# Patient Record
Sex: FEMALE | Race: WHITE | NOT HISPANIC OR LATINO | Employment: FULL TIME | URBAN - METROPOLITAN AREA
[De-identification: names, ages, dates, MRNs, and addresses within clinical notes are randomized per-mention and may not be internally consistent; named-entity substitution may affect disease eponyms.]

---

## 2017-01-05 ENCOUNTER — APPOINTMENT (OUTPATIENT)
Dept: PHYSICAL THERAPY | Facility: HOSPITAL | Age: 62
End: 2017-01-05
Attending: INTERNAL MEDICINE

## 2017-01-12 ENCOUNTER — APPOINTMENT (OUTPATIENT)
Dept: PHYSICAL THERAPY | Facility: HOSPITAL | Age: 62
End: 2017-01-12
Attending: INTERNAL MEDICINE

## 2017-01-19 ENCOUNTER — APPOINTMENT (OUTPATIENT)
Dept: PHYSICAL THERAPY | Facility: HOSPITAL | Age: 62
End: 2017-01-19
Attending: INTERNAL MEDICINE

## 2017-07-06 ENCOUNTER — APPOINTMENT (OUTPATIENT)
Dept: WOMENS IMAGING | Facility: HOSPITAL | Age: 62
End: 2017-07-06

## 2017-07-06 PROCEDURE — 77067 SCR MAMMO BI INCL CAD: CPT | Performed by: RADIOLOGY

## 2017-07-06 PROCEDURE — G0202 SCR MAMMO BI INCL CAD: HCPCS | Performed by: RADIOLOGY

## 2017-07-17 ENCOUNTER — APPOINTMENT (OUTPATIENT)
Dept: WOMENS IMAGING | Facility: HOSPITAL | Age: 62
End: 2017-07-17

## 2017-07-17 PROCEDURE — 76641 ULTRASOUND BREAST COMPLETE: CPT | Performed by: RADIOLOGY

## 2017-07-17 PROCEDURE — G0206 DX MAMMO INCL CAD UNI: HCPCS | Performed by: RADIOLOGY

## 2017-07-17 PROCEDURE — 77065 DX MAMMO INCL CAD UNI: CPT | Performed by: RADIOLOGY

## 2018-01-05 ENCOUNTER — OFFICE VISIT (OUTPATIENT)
Dept: INTERNAL MEDICINE | Facility: CLINIC | Age: 63
End: 2018-01-05

## 2018-01-05 VITALS
BODY MASS INDEX: 22.76 KG/M2 | HEIGHT: 65 IN | SYSTOLIC BLOOD PRESSURE: 145 MMHG | OXYGEN SATURATION: 94 % | HEART RATE: 65 BPM | DIASTOLIC BLOOD PRESSURE: 73 MMHG | WEIGHT: 136.6 LBS | TEMPERATURE: 98.5 F

## 2018-01-05 DIAGNOSIS — Z00.00 HEALTH CARE MAINTENANCE: Primary | ICD-10-CM

## 2018-01-05 DIAGNOSIS — M25.542 ARTHRALGIA OF HANDS, BILATERAL: ICD-10-CM

## 2018-01-05 DIAGNOSIS — M25.541 ARTHRALGIA OF HANDS, BILATERAL: ICD-10-CM

## 2018-01-05 PROCEDURE — 99396 PREV VISIT EST AGE 40-64: CPT | Performed by: INTERNAL MEDICINE

## 2018-01-06 LAB
ALBUMIN SERPL-MCNC: 4.7 G/DL (ref 3.5–5.2)
ALBUMIN/GLOB SERPL: 1.6 G/DL
ALP SERPL-CCNC: 59 U/L (ref 39–117)
ALT SERPL-CCNC: 18 U/L (ref 1–33)
APPEARANCE UR: (no result)
AST SERPL-CCNC: 17 U/L (ref 1–32)
BACTERIA #/AREA URNS HPF: ABNORMAL /HPF
BASOPHILS # BLD AUTO: 0.02 10*3/MM3 (ref 0–0.2)
BASOPHILS NFR BLD AUTO: 0.4 % (ref 0–1.5)
BILIRUB SERPL-MCNC: 0.4 MG/DL (ref 0.1–1.2)
BILIRUB UR QL STRIP: NEGATIVE
BUN SERPL-MCNC: 11 MG/DL (ref 8–23)
BUN/CREAT SERPL: 12.6 (ref 7–25)
CALCIUM SERPL-MCNC: 10.2 MG/DL (ref 8.6–10.5)
CASTS URNS MICRO: ABNORMAL
CHLORIDE SERPL-SCNC: 104 MMOL/L (ref 98–107)
CO2 SERPL-SCNC: 28.4 MMOL/L (ref 22–29)
COLOR UR: YELLOW
CREAT SERPL-MCNC: 0.87 MG/DL (ref 0.57–1)
CRP SERPL-MCNC: 0.08 MG/DL (ref 0–0.5)
EOSINOPHIL # BLD AUTO: 0.12 10*3/MM3 (ref 0–0.7)
EOSINOPHIL NFR BLD AUTO: 2.2 % (ref 0.3–6.2)
EPI CELLS #/AREA URNS HPF: ABNORMAL /HPF
ERYTHROCYTE [DISTWIDTH] IN BLOOD BY AUTOMATED COUNT: 12.9 % (ref 11.7–13)
GLOBULIN SER CALC-MCNC: 3 GM/DL
GLUCOSE SERPL-MCNC: 94 MG/DL (ref 65–99)
GLUCOSE UR QL: NEGATIVE
HCT VFR BLD AUTO: 43.3 % (ref 35.6–45.5)
HGB BLD-MCNC: 14.4 G/DL (ref 11.9–15.5)
HGB UR QL STRIP: (no result)
IMM GRANULOCYTES # BLD: 0 10*3/MM3 (ref 0–0.03)
IMM GRANULOCYTES NFR BLD: 0 % (ref 0–0.5)
KETONES UR QL STRIP: NEGATIVE
LEUKOCYTE ESTERASE UR QL STRIP: NEGATIVE
LYMPHOCYTES # BLD AUTO: 1.79 10*3/MM3 (ref 0.9–4.8)
LYMPHOCYTES NFR BLD AUTO: 32.8 % (ref 19.6–45.3)
MCH RBC QN AUTO: 31.2 PG (ref 26.9–32)
MCHC RBC AUTO-ENTMCNC: 33.3 G/DL (ref 32.4–36.3)
MCV RBC AUTO: 93.9 FL (ref 80.5–98.2)
MONOCYTES # BLD AUTO: 0.3 10*3/MM3 (ref 0.2–1.2)
MONOCYTES NFR BLD AUTO: 5.5 % (ref 5–12)
NEUTROPHILS # BLD AUTO: 3.22 10*3/MM3 (ref 1.9–8.1)
NEUTROPHILS NFR BLD AUTO: 59.1 % (ref 42.7–76)
NITRITE UR QL STRIP: NEGATIVE
PH UR STRIP: 6.5 [PH] (ref 5–8)
PLATELET # BLD AUTO: 184 10*3/MM3 (ref 140–500)
POTASSIUM SERPL-SCNC: 4.2 MMOL/L (ref 3.5–5.2)
PROT SERPL-MCNC: 7.7 G/DL (ref 6–8.5)
PROT UR QL STRIP: NEGATIVE
RBC # BLD AUTO: 4.61 10*6/MM3 (ref 3.9–5.2)
RBC #/AREA URNS HPF: ABNORMAL /HPF
RHEUMATOID FACT SERPL-ACNC: 13.1 IU/ML (ref 0–13.9)
SODIUM SERPL-SCNC: 145 MMOL/L (ref 136–145)
SP GR UR: 1.01 (ref 1–1.03)
T4 FREE SERPL-MCNC: 1.41 NG/DL (ref 0.93–1.7)
TSH SERPL DL<=0.005 MIU/L-ACNC: 1.56 MIU/ML (ref 0.27–4.2)
UROBILINOGEN UR STRIP-MCNC: (no result) MG/DL
WBC # BLD AUTO: 5.45 10*3/MM3 (ref 4.5–10.7)
WBC #/AREA URNS HPF: ABNORMAL /HPF

## 2018-01-22 NOTE — PROGRESS NOTES
Subjective   Carol Johnson is a 62 y.o. female.   She is here today for complete physical exam except for gynecological part along with arthralgia of both hands and everything is stable overall  History of Present Illness   She is here today for complete exam except for gynecological part along with arthralgia of both hands and everything is stable overall except for the arthralgia  The following portions of the patient's history were reviewed and updated as appropriate: allergies, current medications, past family history, past medical history, past social history, past surgical history and problem list.    Review of Systems   Musculoskeletal: Positive for arthralgias.   All other systems reviewed and are negative.      Objective   Physical Exam   Constitutional: She is oriented to person, place, and time. Vital signs are normal. She appears well-developed and well-nourished. She is active.   HENT:   Head: Normocephalic and atraumatic.   Right Ear: Hearing, tympanic membrane, external ear and ear canal normal.   Left Ear: Hearing, tympanic membrane, external ear and ear canal normal.   Nose: Nose normal.   Mouth/Throat: Uvula is midline, oropharynx is clear and moist and mucous membranes are normal.   Eyes: Conjunctivae, EOM and lids are normal. Pupils are equal, round, and reactive to light. Right eye exhibits no discharge. Left eye exhibits no discharge.   Neck: Trachea normal, normal range of motion, full passive range of motion without pain and phonation normal. Neck supple. Carotid bruit is not present. No edema present. No thyroid mass and no thyromegaly present.   Cardiovascular: Normal rate, regular rhythm, normal heart sounds, intact distal pulses and normal pulses.  Exam reveals no gallop and no friction rub.    No murmur heard.  Pulmonary/Chest: Effort normal and breath sounds normal. No respiratory distress. She has no wheezes. She has no rales. Right breast exhibits no inverted nipple, no mass, no  nipple discharge, no skin change and no tenderness. Left breast exhibits no inverted nipple, no mass, no nipple discharge, no skin change and no tenderness. Breasts are symmetrical. There is no breast swelling.   Abdominal: Soft. Normal appearance, normal aorta and bowel sounds are normal. She exhibits no distension, no abdominal bruit and no mass. There is no hepatosplenomegaly. There is no tenderness. There is no rebound, no guarding and no CVA tenderness. No hernia. Hernia confirmed negative in the right inguinal area and confirmed negative in the left inguinal area.   Genitourinary: No breast tenderness, discharge or bleeding.   Musculoskeletal: Normal range of motion. She exhibits tenderness (both hands). She exhibits no edema.       Vascular Status -  Her exam exhibits right foot vasculature normal. Her exam exhibits no right foot edema. Her exam exhibits left foot vasculature normal. Her exam exhibits no left foot edema.   Skin Integrity  -  Her right foot skin is intact.     Carol 's left foot skin is intact. .  Lymphadenopathy:     She has no cervical adenopathy.     She has no axillary adenopathy.        Right: No inguinal and no supraclavicular adenopathy present.        Left: No inguinal and no supraclavicular adenopathy present.   Neurological: She is alert and oriented to person, place, and time. She has normal strength. No cranial nerve deficit or sensory deficit. She exhibits normal muscle tone. She displays a negative Romberg sign. Coordination normal.   Skin: Skin is warm, dry and intact. No cyanosis. Nails show no clubbing.   Psychiatric: She has a normal mood and affect. Her speech is normal and behavior is normal. Judgment and thought content normal. Cognition and memory are normal.   Nursing note and vitals reviewed.      Assessment/Plan   Diagnoses and all orders for this visit:    Health care maintenance  -     CBC & Differential  -     Comprehensive Metabolic Panel  -     T4, Free  -      TSH  -     Urinalysis With Microscopic - Urine, Clean Catch  -     Rheumatoid Factor, Quant  -     C-reactive Protein    Arthralgia of hands, bilateral  -     CBC & Differential  -     Comprehensive Metabolic Panel  -     T4, Free  -     TSH  -     Urinalysis With Microscopic - Urine, Clean Catch  -     Rheumatoid Factor, Quant  -     C-reactive Protein    Other orders  -     Microscopic Examination      Healthcare maintenance fasting labs vaccines colonoscopies pelvic exams mammograms DEXA scans on a regular basis  Arthralgia of bilateral hands we will check for rheumatoid arthritis and go from there

## 2018-07-10 ENCOUNTER — APPOINTMENT (OUTPATIENT)
Dept: WOMENS IMAGING | Facility: HOSPITAL | Age: 63
End: 2018-07-10

## 2018-07-10 PROCEDURE — 77067 SCR MAMMO BI INCL CAD: CPT | Performed by: RADIOLOGY

## 2019-07-11 ENCOUNTER — APPOINTMENT (OUTPATIENT)
Dept: WOMENS IMAGING | Facility: HOSPITAL | Age: 64
End: 2019-07-11

## 2019-07-11 PROCEDURE — 77067 SCR MAMMO BI INCL CAD: CPT | Performed by: RADIOLOGY

## 2019-07-18 ENCOUNTER — APPOINTMENT (OUTPATIENT)
Dept: WOMENS IMAGING | Facility: HOSPITAL | Age: 64
End: 2019-07-18

## 2019-07-18 PROCEDURE — 76641 ULTRASOUND BREAST COMPLETE: CPT | Performed by: RADIOLOGY

## 2019-07-18 PROCEDURE — 77065 DX MAMMO INCL CAD UNI: CPT | Performed by: RADIOLOGY

## 2020-07-17 ENCOUNTER — APPOINTMENT (OUTPATIENT)
Dept: WOMENS IMAGING | Facility: HOSPITAL | Age: 65
End: 2020-07-17

## 2020-07-17 PROCEDURE — 77063 BREAST TOMOSYNTHESIS BI: CPT | Performed by: RADIOLOGY

## 2020-07-17 PROCEDURE — 77067 SCR MAMMO BI INCL CAD: CPT | Performed by: RADIOLOGY

## 2020-10-19 ENCOUNTER — OFFICE VISIT (OUTPATIENT)
Dept: INTERNAL MEDICINE | Facility: CLINIC | Age: 65
End: 2020-10-19

## 2020-10-19 VITALS
BODY MASS INDEX: 22.99 KG/M2 | HEIGHT: 65 IN | WEIGHT: 138 LBS | SYSTOLIC BLOOD PRESSURE: 135 MMHG | TEMPERATURE: 97.5 F | HEART RATE: 74 BPM | OXYGEN SATURATION: 99 % | DIASTOLIC BLOOD PRESSURE: 78 MMHG

## 2020-10-19 DIAGNOSIS — Z13.6 ENCOUNTER FOR LIPID SCREENING FOR CARDIOVASCULAR DISEASE: ICD-10-CM

## 2020-10-19 DIAGNOSIS — Z13.220 ENCOUNTER FOR LIPID SCREENING FOR CARDIOVASCULAR DISEASE: ICD-10-CM

## 2020-10-19 DIAGNOSIS — Z76.89 ENCOUNTER TO ESTABLISH CARE: Primary | ICD-10-CM

## 2020-10-19 DIAGNOSIS — H61.23 BILATERAL IMPACTED CERUMEN: ICD-10-CM

## 2020-10-19 DIAGNOSIS — G56.03 BILATERAL CARPAL TUNNEL SYNDROME: ICD-10-CM

## 2020-10-19 PROCEDURE — 69209 REMOVE IMPACTED EAR WAX UNI: CPT | Performed by: NURSE PRACTITIONER

## 2020-10-19 PROCEDURE — 99214 OFFICE O/P EST MOD 30 MIN: CPT | Performed by: NURSE PRACTITIONER

## 2020-10-19 NOTE — PROGRESS NOTES
Procedure   Ear Cerumen Removal    Date/Time: 10/19/2020 10:07 AM  Performed by: Nydia Corcoran MA  Authorized by: Jessica Davis III NP-C   Consent: Verbal consent obtained.  Consent given by: patient  Patient understanding: patient states understanding of the procedure being performed  Patient consent: the patient's understanding of the procedure matches consent given  Procedure consent: procedure consent matches procedure scheduled  Patient identity confirmed: verbally with patient    Anesthesia:  Local Anesthetic: proparacaine drops  Location details: left ear and right ear  Procedure type: irrigation   Sedation:  Patient sedated: no

## 2020-10-19 NOTE — PROGRESS NOTES
Name: Carol Johnson  :  1955    Subjective:      Chief Complaint   Patient presents with   • Establish Care   • Ear Problem   • Hand Pain        Carol Johnson is a 64 y.o. female patient.      She was previously Dr. Bentley patient who is no longer in this practice.  She is here to establish care.  She is new to me. She was last seen by Dr. Bentley in 2018.     At that time she was diagnosed with BPPV  She has new problem with the right ear.  States for the last 2 to 3 weeks her hearing has decreased.  Ear feels full.  No pain/no dizziness.  No fever or sore throat.    BL hand pain  This is a chronic problem.  Dr Bentley worked up for RA, negative.    Tingling to thumb and index finer at times.  No loss of strength.       Health Maintenance:   Last colonoscopy 2009 by Dr. Wynn   She declines further colonoscopies.  She is aware risk.    GYN: Dr. Ovalles.  2020 for mammogram which was negative per patient.    Yearly visit at [facility]: She's had 2 melanomas removed from R arm .     I have reviewed the patient's medical history in detail and updated the computerized patient record.    Past Medical History:   Diagnosis Date   • Cancer (CMS/HCC)     skin cancer per pt       Past Surgical History:   Procedure Laterality Date   • HYSTERECTOMY         Family History   Problem Relation Age of Onset   • Heart failure Mother    • Heart failure Father    • No Known Problems Brother    • No Known Problems Brother        Social History     Socioeconomic History   • Marital status: Unknown     Spouse name: Not on file   • Number of children: Not on file   • Years of education: Not on file   • Highest education level: Not on file   Tobacco Use   • Smoking status: Former Smoker   • Smokeless tobacco: Never Used   Substance and Sexual Activity   • Alcohol use: Yes     Comment: rare         There is no immunization history on file for this patient.      Review of Systems:      Review of Systems   Constitutional: Negative for chills, fever and unexpected weight change.   HENT: Positive for hearing loss. Negative for ear pain.    Respiratory: Negative for shortness of breath.    Cardiovascular: Negative for chest pain and leg swelling.   Gastrointestinal: Negative.    Genitourinary: Negative.    Musculoskeletal:        Hand pain   Neurological: Negative for dizziness, light-headedness, numbness and headaches.   Psychiatric/Behavioral: Negative.          Objective:      Physical Exam:   Physical Exam  Vitals signs reviewed.   Constitutional:       Appearance: She is well-developed.   HENT:      Head: Normocephalic.      Comments: Wearing mask due to COVID      Right Ear: Ear canal normal.      Left Ear: Ear canal normal.      Ears:      Comments: BL cerumen impaction  Eyes:      Conjunctiva/sclera: Conjunctivae normal.      Pupils: Pupils are equal, round, and reactive to light.   Neck:      Musculoskeletal: Normal range of motion and neck supple.      Thyroid: No thyromegaly.   Cardiovascular:      Rate and Rhythm: Normal rate and regular rhythm.      Pulses: Normal pulses.      Heart sounds: Normal heart sounds.   Pulmonary:      Effort: Pulmonary effort is normal.      Breath sounds: Normal breath sounds.      Comments: E/U   Abdominal:      General: Bowel sounds are normal.      Palpations: Abdomen is soft.   Musculoskeletal: Normal range of motion.         General: No swelling.      Comments: Bilateral hands:   is equal, no atrophy  Positive sensation bilateral  Positive Tinel sign bilateral   Lymphadenopathy:      Cervical: No cervical adenopathy.   Skin:     General: Skin is warm and dry.      Capillary Refill: Capillary refill takes 2 to 3 seconds.   Neurological:      Mental Status: She is alert and oriented to person, place, and time.   Psychiatric:         Behavior: Behavior normal. Behavior is cooperative.         Thought Content: Thought content normal.         Judgment:  "Judgment normal.           Vital Signs:  Vitals:    10/19/20 0922 10/19/20 1008   BP: 152/88 135/78   BP Location: Left arm    Patient Position: Sitting    Cuff Size: Adult    Pulse: 74    Temp: 97.5 °F (36.4 °C)    TempSrc: Oral    SpO2: 99%    Weight: 62.6 kg (138 lb)    Height: 165.1 cm (65\")      Body mass index is 22.96 kg/m².      Results Review:      REVIEWED AND DISCUSSED LAB RESULTS WITH PATIENT      Requested Prescriptions      No prescriptions requested or ordered in this encounter     Routine medications provided by this office will also be refilled via pharmacy request.       Current Outpatient Medications:   •  MINIVELLE 0.1 MG/24HR patch, , Disp: , Rfl:   •  Testosterone Propionate (FIRST-TESTOSTERONE) 2 % ointment, Place  on the skin., Disp: , Rfl:        Assessment and Plan:        Problems Addressed this Visit        Nervous and Auditory    Bilateral carpal tunnel syndrome     Try cock-up splints  If not improved will refer to hand surgeon           Other Visit Diagnoses     Encounter to establish care    -  Primary    Encounter for lipid screening for cardiovascular disease        Relevant Orders    Comprehensive Metabolic Panel    Lipid Panel With LDL / HDL Ratio    CBC (No Diff)    Bilateral impacted cerumen        Relevant Orders    Ear Cerumen Removal      Diagnoses       Codes Comments    Encounter to establish care    -  Primary ICD-10-CM: Z76.89  ICD-9-CM: V65.8     Encounter for lipid screening for cardiovascular disease     ICD-10-CM: Z13.220, Z13.6  ICD-9-CM: V77.91, V81.2     Bilateral impacted cerumen     ICD-10-CM: H61.23  ICD-9-CM: 380.4     Bilateral carpal tunnel syndrome     ICD-10-CM: G56.03  ICD-9-CM: 354.0         Ears were cleared, loosened with curette.  States after being flushed hearing back to baseline.  TMs dull gray, intact       Discussed any change in Rx and discussed visit with patient.  All questions answered.      Return in about 1 year (around 10/19/2021) for " "Annual physical.    Jake \"Chase\" Ryan, APRN   10/19/20    Dragon disclaimer:   Much of this encounter note is an electronic transcription/translation of spoken language to printed text. The electronic translation of spoken language may permit erroneous, or at times, nonsensical words or phrases to be inadvertently transcribed; Although I have reviewed the note for such errors, some may still exist.     Additional Patient Counseling:       Patient Instructions   Cock-up splint when sleeping.         October is Breast Cancer Awareness Month  Each year more than 245,000 women get breast cancer in the [place].  Breast cancer in men is less frequent but you should check yourself at regular intervals.    Monitor your breast for changes  • Any change in the size or the shape of the breast  • Pain in any area of the breast  • Nipple discharge other than breast milk (including blood)  • A new lump in the breast or underarm  *Continue regular scheduled mammograms    Diet:    • Eat vegetables, fruits, whole grain, low-fat dairy, poultry, fish, beans, nontropical vegetable oils, and nuts, but low amounts of red meat (i.e., Mediterranean-style diet, DASH [Dietary Approaches to Stop Hypertension] diet).  • Limit sugary drinks and sweets.  • Limit saturated and trans fat to 5% to 6% of calories.  • Limit sodium intake to 2,400 mg daily (about one teaspoon table salt [kosher/sea salt have less sodium per teaspoon]).  • Fad diets will come and go.  Studies show that the most effective diet is one that you can continue long term.     Weight loss / Calorie Counting Apps:    • Lose It!   • MyFitness Pal   • Works great when you try it with a partner/ friend    Exercise:   • Engage in moderate-to-vigorous aerobic activity for at least 40 minutes (on average) three to four times each week.    Wearables:   • Activity tracker   • Step tracker: getting 7,500 steps daily can cut your cardiac risks by 44%     Bone Health: "   • Https://www.nof.org/patients/treatment/nutrition/  • Routine weight bearing exercise    Vaccines:   • Flu vaccine every fall  • https://www.vaccinateyourfamily.org/

## 2020-10-19 NOTE — PATIENT INSTRUCTIONS
Cock-up splint when sleeping.         October is Breast Cancer Awareness Month  Each year more than 245,000 women get breast cancer in the [place].  Breast cancer in men is less frequent but you should check yourself at regular intervals.    Monitor your breast for changes  • Any change in the size or the shape of the breast  • Pain in any area of the breast  • Nipple discharge other than breast milk (including blood)  • A new lump in the breast or underarm  *Continue regular scheduled mammograms    Diet:    • Eat vegetables, fruits, whole grain, low-fat dairy, poultry, fish, beans, nontropical vegetable oils, and nuts, but low amounts of red meat (i.e., Mediterranean-style diet, DASH [Dietary Approaches to Stop Hypertension] diet).  • Limit sugary drinks and sweets.  • Limit saturated and trans fat to 5% to 6% of calories.  • Limit sodium intake to 2,400 mg daily (about one teaspoon table salt [kosher/sea salt have less sodium per teaspoon]).  • Fad diets will come and go.  Studies show that the most effective diet is one that you can continue long term.     Weight loss / Calorie Counting Apps:    • Lose It!   • MyFitTravelmenu Pal   • Works great when you try it with a partner/ friend    Exercise:   • Engage in moderate-to-vigorous aerobic activity for at least 40 minutes (on average) three to four times each week.    Wearables:   • Activity tracker   • Step tracker: getting 7,500 steps daily can cut your cardiac risks by 44%     Bone Health:   • Https://www.nof.org/patients/treatment/nutrition/  • Routine weight bearing exercise    Vaccines:   • Flu vaccine every fall  • https://www.vaccinateyourfamily.org/

## 2020-10-20 LAB
ALBUMIN SERPL-MCNC: 4.6 G/DL (ref 3.5–5.2)
ALBUMIN/GLOB SERPL: 2.4 G/DL
ALP SERPL-CCNC: 60 U/L (ref 39–117)
ALT SERPL-CCNC: 18 U/L (ref 1–33)
AST SERPL-CCNC: 18 U/L (ref 1–32)
BILIRUB SERPL-MCNC: 0.5 MG/DL (ref 0–1.2)
BUN SERPL-MCNC: 9 MG/DL (ref 8–23)
BUN/CREAT SERPL: 10.2 (ref 7–25)
CALCIUM SERPL-MCNC: 9.3 MG/DL (ref 8.6–10.5)
CHLORIDE SERPL-SCNC: 105 MMOL/L (ref 98–107)
CHOLEST SERPL-MCNC: 154 MG/DL (ref 0–200)
CO2 SERPL-SCNC: 27.7 MMOL/L (ref 22–29)
CREAT SERPL-MCNC: 0.88 MG/DL (ref 0.57–1)
ERYTHROCYTE [DISTWIDTH] IN BLOOD BY AUTOMATED COUNT: 12.4 % (ref 12.3–15.4)
GLOBULIN SER CALC-MCNC: 1.9 GM/DL
GLUCOSE SERPL-MCNC: 86 MG/DL (ref 65–99)
HCT VFR BLD AUTO: 41.4 % (ref 34–46.6)
HDLC SERPL-MCNC: 61 MG/DL (ref 40–60)
HGB BLD-MCNC: 14 G/DL (ref 12–15.9)
LDLC SERPL CALC-MCNC: 77 MG/DL (ref 0–100)
LDLC/HDLC SERPL: 1.24 {RATIO}
MCH RBC QN AUTO: 31.3 PG (ref 26.6–33)
MCHC RBC AUTO-ENTMCNC: 33.8 G/DL (ref 31.5–35.7)
MCV RBC AUTO: 92.6 FL (ref 79–97)
PLATELET # BLD AUTO: 177 10*3/MM3 (ref 140–450)
POTASSIUM SERPL-SCNC: 4.8 MMOL/L (ref 3.5–5.2)
PROT SERPL-MCNC: 6.5 G/DL (ref 6–8.5)
RBC # BLD AUTO: 4.47 10*6/MM3 (ref 3.77–5.28)
SODIUM SERPL-SCNC: 141 MMOL/L (ref 136–145)
TRIGL SERPL-MCNC: 88 MG/DL (ref 0–150)
VLDLC SERPL CALC-MCNC: 16 MG/DL (ref 5–40)
WBC # BLD AUTO: 4.26 10*3/MM3 (ref 3.4–10.8)

## 2021-03-16 ENCOUNTER — BULK ORDERING (OUTPATIENT)
Dept: CASE MANAGEMENT | Facility: OTHER | Age: 66
End: 2021-03-16

## 2021-03-16 DIAGNOSIS — Z23 IMMUNIZATION DUE: ICD-10-CM

## 2021-07-19 ENCOUNTER — APPOINTMENT (OUTPATIENT)
Dept: WOMENS IMAGING | Facility: HOSPITAL | Age: 66
End: 2021-07-19

## 2021-07-19 PROCEDURE — 77067 SCR MAMMO BI INCL CAD: CPT | Performed by: RADIOLOGY

## 2021-07-19 PROCEDURE — 77063 BREAST TOMOSYNTHESIS BI: CPT | Performed by: RADIOLOGY

## 2021-08-19 ENCOUNTER — APPOINTMENT (OUTPATIENT)
Dept: WOMENS IMAGING | Facility: HOSPITAL | Age: 66
End: 2021-08-19

## 2021-08-19 PROCEDURE — G0279 TOMOSYNTHESIS, MAMMO: HCPCS | Performed by: RADIOLOGY

## 2021-08-19 PROCEDURE — 77065 DX MAMMO INCL CAD UNI: CPT | Performed by: RADIOLOGY

## 2021-08-19 PROCEDURE — 77061 BREAST TOMOSYNTHESIS UNI: CPT | Performed by: RADIOLOGY

## 2021-08-19 PROCEDURE — 76642 ULTRASOUND BREAST LIMITED: CPT | Performed by: RADIOLOGY

## 2021-10-21 ENCOUNTER — OFFICE VISIT (OUTPATIENT)
Dept: INTERNAL MEDICINE | Facility: CLINIC | Age: 66
End: 2021-10-21

## 2021-10-21 VITALS
RESPIRATION RATE: 16 BRPM | OXYGEN SATURATION: 97 % | HEART RATE: 65 BPM | DIASTOLIC BLOOD PRESSURE: 78 MMHG | TEMPERATURE: 97.3 F | WEIGHT: 138.6 LBS | SYSTOLIC BLOOD PRESSURE: 120 MMHG | HEIGHT: 65 IN | BODY MASS INDEX: 23.09 KG/M2

## 2021-10-21 DIAGNOSIS — Z79.890 HORMONE REPLACEMENT THERAPY: ICD-10-CM

## 2021-10-21 DIAGNOSIS — R53.83 OTHER FATIGUE: ICD-10-CM

## 2021-10-21 DIAGNOSIS — Z13.6 SCREENING FOR HYPERTENSION: ICD-10-CM

## 2021-10-21 DIAGNOSIS — Z00.00 ANNUAL PHYSICAL EXAM: Primary | ICD-10-CM

## 2021-10-21 DIAGNOSIS — Z78.0 POSTMENOPAUSAL: ICD-10-CM

## 2021-10-21 PROCEDURE — 99396 PREV VISIT EST AGE 40-64: CPT | Performed by: NURSE PRACTITIONER

## 2021-10-21 NOTE — PROGRESS NOTES
Chief Complaint  Annual Exam     Subjective:      History of Present Illness {CC  Problem List  Visit  Diagnosis   Encounters  Notes  Medications  Labs  Result Review Imaging  Media :23}     Carol Johnson presents to [facility] for annual exam.     Sadly she lost daughter last year: brain bleed.     Retired: [employer] teacher     History of Present Illness     Carol is here for coordination of medical care, to discuss health maintenance, disease prevention as well as to followup on medical problems.     Patient Care Team:  Jessica Davis III, NP-C as PCP - General (Family Medicine)  Cory Ovalles MD as Consulting Physician (Obstetrics and Gynecology)     Activity level is moderate.   Exercises 7 per week.   Cardio, hip routines    15 in am and 15-20 mins in evening.     Weight trend is stable.      Wt Readings from Last 4 Encounters:   10/21/21 62.9 kg (138 lb 9.6 oz)   10/19/20 62.6 kg (138 lb)   01/05/18 62 kg (136 lb 9.6 oz)   10/14/16 60.8 kg (134 lb)         Health Maintenance Female:    · GYN: [facility]  · Last gynecology appointment: 6/30/21  · Patient's last mammogram was 6/23/2021  · Advised routine self-breast exams monthly.  · Sexually active:   · Pap smears have been: negative  · He provides hormone replacement therapy.     Colon cancer screen:     She has no change in bowel habits.   Patient's last colonoscopy was 6/2009: Dr Wynn.   Negative and declines further.     Vaccines: discussed booster      Last eye exam: 2 years ago - due     Advised regular sunscreen.    See's [facility]       Her cardiovascular risks are:     [] No Known risk factors    [] Hypertension   [] Hyperlipidemia  [] Diabetes    [] Obesity  [] Family history   [] Current or hx tobacco use  [] Sedentary lifestyle   [] Post-menopausal     I have reviewed patient's medical history, any new submitted information provided by patient or medical assistant and  updated medical record.      Objective:      Physical Exam  Vitals reviewed.   Constitutional:       Appearance: Normal appearance. She is well-developed.   HENT:      Head: Normocephalic.      Nose: Nose normal.      Mouth/Throat:      Pharynx: Uvula midline.   Eyes:      Conjunctiva/sclera: Conjunctivae normal.      Pupils: Pupils are equal, round, and reactive to light.   Neck:      Thyroid: No thyromegaly.   Cardiovascular:      Rate and Rhythm: Normal rate and regular rhythm.      Pulses: Normal pulses.      Heart sounds: Normal heart sounds, S1 normal and S2 normal. No murmur heard.      Pulmonary:      Effort: Pulmonary effort is normal.      Breath sounds: Normal breath sounds.   Chest:      Chest wall: No deformity.   Abdominal:      General: Bowel sounds are normal.      Palpations: Abdomen is soft.      Tenderness: There is no abdominal tenderness. Negative signs include Nieto's sign.   Genitourinary:     Comments: Deferred   Musculoskeletal:         General: Normal range of motion.      Cervical back: Normal range of motion and neck supple.      Right lower leg: No edema.      Left lower leg: No edema.   Lymphadenopathy:      Cervical: No cervical adenopathy.   Skin:     General: Skin is warm and dry.      Capillary Refill: Capillary refill takes 2 to 3 seconds.   Neurological:      General: No focal deficit present.      Mental Status: She is alert and oriented to person, place, and time.      Cranial Nerves: No cranial nerve deficit.      Sensory: No sensory deficit.      Coordination: Coordination normal.   Psychiatric:         Mood and Affect: Mood normal.         Speech: Speech normal.         Behavior: Behavior normal. Behavior is cooperative.         Thought Content: Thought content normal.         Judgment: Judgment normal.        Result Review  Data Reviewed:{ Labs  Result Review  Imaging  Med Tab  Media :23}     The following data was reviewed by: Jessica Davis III, NP-C on  "10/21/2021           Vital Signs:   /78 (BP Location: Right arm, Patient Position: Sitting, Cuff Size: Adult)   Pulse 65   Temp 97.3 °F (36.3 °C) (Temporal)   Resp 16   Ht 165.1 cm (65\")   Wt 62.9 kg (138 lb 9.6 oz)   SpO2 97%   BMI 23.06 kg/m²         Requested Prescriptions      No prescriptions requested or ordered in this encounter       Routine medications provided by this office will also be refilled via pharmacy request.       Current Outpatient Medications:   •  MINIVELLE 0.1 MG/24HR patch, , Disp: , Rfl:   •  Testosterone Propionate (FIRST-TESTOSTERONE) 2 % ointment, Place  on the skin., Disp: , Rfl:      Assessment and Plan:      Assessment and Plan {CC Problem List  Visit Diagnosis  ROS  Review (Popup)  Health Maintenance  Quality  BestPractice  Medications  SmartSets  SnapShot Encounters  Media :23}     Problem List Items Addressed This Visit        Endocrine and Metabolic    Hormone replacement therapy (Chronic)    Overview     Prescribed by GYN           Other Visit Diagnoses     Annual physical exam    -  Primary    Relevant Orders    Comprehensive Metabolic Panel    Lipid Panel With LDL / HDL Ratio    CBC (No Diff)    TSH    T4, free    Vitamin D 25 hydroxy    Postmenopausal        Relevant Orders    Vitamin D 25 hydroxy    Other fatigue        Relevant Orders    TSH    T4, free    Screening for hypertension        BP controlled           Follow Up {Instructions Charge Capture  Follow-up Communications :23}     Return in about 1 year (around 10/21/2022) for Annual physical.    Patient was given instructions and counseling regarding her condition or for health maintenance advice. Please see specific information pulled into the AVS if appropriate.    Dragon disclaimer:   Much of this encounter note is an electronic transcription/translation of spoken language to printed text. The electronic translation of spoken language may permit erroneous, or at times, nonsensical words " or phrases to be inadvertently transcribed; Although I have reviewed the note for such errors, some may still exist.     Additional Patient Counseling:       Patient Instructions       October is Breast Cancer Awareness Month  Each year more than 245,000 women get breast cancer in the [place].  Breast cancer in men is less frequent but you should check yourself at regular intervals.    Monitor your breast for changes  • Any change in the size or the shape of the breast  • Pain in any area of the breast  • Nipple discharge other than breast milk (including blood)  • A new lump in the breast or underarm  *Continue regular scheduled mammograms    Diet:    • Eat vegetables, fruits, whole grain, low-fat dairy, poultry, fish, beans, nontropical vegetable oils, and nuts, but low amounts of red meat (i.e., Mediterranean-style diet, DASH [Dietary Approaches to Stop Hypertension] diet).  • Limit sugary drinks and sweets.  • Limit saturated and trans fat to 5% to 6% of calories.  • Limit sodium intake to 2,400 mg daily (about one teaspoon table salt [kosher/sea salt have less sodium per teaspoon]).  • Fad diets will come and go.  Studies show that the most effective diet is one that you can continue long term.     Weight loss / Calorie Counting Apps:    • Lose It!   • MyFitness Pal   • Works great when you try it with a partner/ friend    Exercise:   • Engage in moderate-to-vigorous aerobic activity for at least 40 minutes (on average) three to four times each week.    Wearables:   • Activity tracker   • Step tracker: getting 7,500 steps daily can cut your cardiac risks by 44%     Bone Health:   • Https://www.nof.org/patients/treatment/nutrition/  • Routine weight bearing exercise    Vaccines:   • Flu vaccine every fall  • https://www.vaccinateyourfamily.org/        COVID Testing infromation:          [email or web address]

## 2021-10-21 NOTE — PATIENT INSTRUCTIONS
October is Breast Cancer Awareness Month  Each year more than 245,000 women get breast cancer in the [place].  Breast cancer in men is less frequent but you should check yourself at regular intervals.    Monitor your breast for changes  • Any change in the size or the shape of the breast  • Pain in any area of the breast  • Nipple discharge other than breast milk (including blood)  • A new lump in the breast or underarm  *Continue regular scheduled mammograms    Diet:    • Eat vegetables, fruits, whole grain, low-fat dairy, poultry, fish, beans, nontropical vegetable oils, and nuts, but low amounts of red meat (i.e., Mediterranean-style diet, DASH [Dietary Approaches to Stop Hypertension] diet).  • Limit sugary drinks and sweets.  • Limit saturated and trans fat to 5% to 6% of calories.  • Limit sodium intake to 2,400 mg daily (about one teaspoon table salt [kosher/sea salt have less sodium per teaspoon]).  • Fad diets will come and go.  Studies show that the most effective diet is one that you can continue long term.     Weight loss / Calorie Counting Apps:    • Lose It!   • MyThis Week In Pal   • Works great when you try it with a partner/ friend    Exercise:   • Engage in moderate-to-vigorous aerobic activity for at least 40 minutes (on average) three to four times each week.    Wearables:   • Activity tracker   • Step tracker: getting 7,500 steps daily can cut your cardiac risks by 44%     Bone Health:   • Https://[email or web address].org/patients/treatment/nutrition/  • Routine weight bearing exercise    Vaccines:   • Flu vaccine every fall  • https://www.vaccinateyourfamily.org/        COVID Testing infromation:          [email or web address]

## 2021-10-22 LAB
25(OH)D3+25(OH)D2 SERPL-MCNC: 64.5 NG/ML (ref 30–100)
ALBUMIN SERPL-MCNC: 4.5 G/DL (ref 3.8–4.8)
ALBUMIN/GLOB SERPL: 1.8 {RATIO} (ref 1.2–2.2)
ALP SERPL-CCNC: 64 IU/L (ref 44–121)
ALT SERPL-CCNC: 16 IU/L (ref 0–32)
AST SERPL-CCNC: 19 IU/L (ref 0–40)
BILIRUB SERPL-MCNC: 0.6 MG/DL (ref 0–1.2)
BUN SERPL-MCNC: 11 MG/DL (ref 8–27)
BUN/CREAT SERPL: 12 (ref 12–28)
CALCIUM SERPL-MCNC: 9.5 MG/DL (ref 8.7–10.3)
CHLORIDE SERPL-SCNC: 104 MMOL/L (ref 96–106)
CHOLEST SERPL-MCNC: 169 MG/DL (ref 100–199)
CO2 SERPL-SCNC: 21 MMOL/L (ref 20–29)
CREAT SERPL-MCNC: 0.95 MG/DL (ref 0.57–1)
ERYTHROCYTE [DISTWIDTH] IN BLOOD BY AUTOMATED COUNT: 12.4 % (ref 11.7–15.4)
GLOBULIN SER CALC-MCNC: 2.5 G/DL (ref 1.5–4.5)
GLUCOSE SERPL-MCNC: 91 MG/DL (ref 65–99)
HCT VFR BLD AUTO: 41 % (ref 34–46.6)
HDLC SERPL-MCNC: 61 MG/DL
HGB BLD-MCNC: 13.9 G/DL (ref 11.1–15.9)
LDLC SERPL CALC-MCNC: 91 MG/DL (ref 0–99)
LDLC/HDLC SERPL: 1.5 RATIO (ref 0–3.2)
MCH RBC QN AUTO: 30.5 PG (ref 26.6–33)
MCHC RBC AUTO-ENTMCNC: 33.9 G/DL (ref 31.5–35.7)
MCV RBC AUTO: 90 FL (ref 79–97)
PLATELET # BLD AUTO: 163 X10E3/UL (ref 150–450)
POTASSIUM SERPL-SCNC: 5.2 MMOL/L (ref 3.5–5.2)
PROT SERPL-MCNC: 7 G/DL (ref 6–8.5)
RBC # BLD AUTO: 4.55 X10E6/UL (ref 3.77–5.28)
SODIUM SERPL-SCNC: 144 MMOL/L (ref 134–144)
T4 FREE SERPL-MCNC: 1.31 NG/DL (ref 0.82–1.77)
TRIGL SERPL-MCNC: 94 MG/DL (ref 0–149)
TSH SERPL DL<=0.005 MIU/L-ACNC: 3.35 UIU/ML (ref 0.45–4.5)
VLDLC SERPL CALC-MCNC: 17 MG/DL (ref 5–40)
WBC # BLD AUTO: 4 X10E3/UL (ref 3.4–10.8)

## 2022-07-20 ENCOUNTER — APPOINTMENT (OUTPATIENT)
Dept: WOMENS IMAGING | Facility: HOSPITAL | Age: 67
End: 2022-07-20

## 2022-07-20 PROCEDURE — 77067 SCR MAMMO BI INCL CAD: CPT | Performed by: RADIOLOGY

## 2022-07-20 PROCEDURE — 77063 BREAST TOMOSYNTHESIS BI: CPT | Performed by: RADIOLOGY

## 2023-07-24 ENCOUNTER — APPOINTMENT (OUTPATIENT)
Dept: WOMENS IMAGING | Facility: HOSPITAL | Age: 68
End: 2023-07-24
Payer: MEDICARE

## 2023-07-24 PROCEDURE — 77067 SCR MAMMO BI INCL CAD: CPT | Performed by: RADIOLOGY

## 2023-07-24 PROCEDURE — 77063 BREAST TOMOSYNTHESIS BI: CPT | Performed by: RADIOLOGY

## 2023-08-07 ENCOUNTER — APPOINTMENT (OUTPATIENT)
Dept: WOMENS IMAGING | Facility: HOSPITAL | Age: 68
End: 2023-08-07
Payer: MEDICARE

## 2023-08-07 PROCEDURE — 76642 ULTRASOUND BREAST LIMITED: CPT | Performed by: RADIOLOGY

## 2023-08-07 PROCEDURE — 77065 DX MAMMO INCL CAD UNI: CPT | Performed by: RADIOLOGY

## 2023-08-07 PROCEDURE — G0279 TOMOSYNTHESIS, MAMMO: HCPCS | Performed by: RADIOLOGY

## 2024-02-08 ENCOUNTER — APPOINTMENT (OUTPATIENT)
Dept: WOMENS IMAGING | Facility: HOSPITAL | Age: 69
End: 2024-02-08
Payer: MEDICARE

## 2024-02-08 PROCEDURE — 76642 ULTRASOUND BREAST LIMITED: CPT | Performed by: RADIOLOGY

## 2024-02-15 DIAGNOSIS — N60.02 CYST OF LEFT BREAST: ICD-10-CM

## 2024-02-15 DIAGNOSIS — Z12.31 BREAST CANCER SCREENING BY MAMMOGRAM: Primary | ICD-10-CM

## 2024-02-21 DIAGNOSIS — R92.8 ABNORMAL MAMMOGRAM: Primary | ICD-10-CM

## 2024-04-17 ENCOUNTER — TELEPHONE (OUTPATIENT)
Dept: INTERNAL MEDICINE | Facility: CLINIC | Age: 69
End: 2024-04-17
Payer: MEDICARE

## 2024-04-17 NOTE — TELEPHONE ENCOUNTER
Caller:     Relationship:     Best call back number: [phone]    What is the medical concern/diagnosis: WOMEN'S DIAGNOSTIC    What specialty or service is being requested: MAMMOGRAM    What is the provider, practice or medical service name: N/A    What is the office location: [place]    What is the office phone number: N/A    Any additional details: PATIENT CALLED AND RECEIVED A MESSAGE FROM [employer] STATING IM DUE FOR A MAMMOGRAM. PLEASE SCHEDULE AND LET ME KNOW WHEN THIS IS.PATIENT STATES FEBRUARY 2024 TEST SHOWED A COMPLICATED CYST IN LEFT BREAST. PLEASE SCHEDULE ASAP!

## 2024-04-23 ENCOUNTER — OFFICE VISIT (OUTPATIENT)
Dept: INTERNAL MEDICINE | Facility: CLINIC | Age: 69
End: 2024-04-23
Payer: MEDICARE

## 2024-04-23 VITALS
RESPIRATION RATE: 20 BRPM | DIASTOLIC BLOOD PRESSURE: 70 MMHG | HEIGHT: 65 IN | HEART RATE: 75 BPM | SYSTOLIC BLOOD PRESSURE: 130 MMHG | WEIGHT: 138.4 LBS | OXYGEN SATURATION: 99 % | BODY MASS INDEX: 23.06 KG/M2

## 2024-04-23 DIAGNOSIS — M25.542 ARTHRALGIA OF HANDS, BILATERAL: ICD-10-CM

## 2024-04-23 DIAGNOSIS — M25.541 ARTHRALGIA OF HANDS, BILATERAL: ICD-10-CM

## 2024-04-23 DIAGNOSIS — N60.02 BREAST CYST, LEFT: ICD-10-CM

## 2024-04-23 DIAGNOSIS — E55.9 VITAMIN D DEFICIENCY: ICD-10-CM

## 2024-04-23 DIAGNOSIS — Z79.890 HORMONE REPLACEMENT THERAPY: Primary | Chronic | ICD-10-CM

## 2024-04-23 LAB
25(OH)D3+25(OH)D2 SERPL-MCNC: 49.1 NG/ML (ref 30–100)
ALBUMIN SERPL-MCNC: 4.8 G/DL (ref 3.5–5.2)
ALBUMIN/GLOB SERPL: 2.2 G/DL
ALP SERPL-CCNC: 74 U/L (ref 39–117)
ALT SERPL-CCNC: 18 U/L (ref 1–33)
AST SERPL-CCNC: 18 U/L (ref 1–32)
BILIRUB SERPL-MCNC: 0.6 MG/DL (ref 0–1.2)
BUN SERPL-MCNC: 13 MG/DL (ref 8–23)
BUN/CREAT SERPL: 13 (ref 7–25)
CALCIUM SERPL-MCNC: 9.9 MG/DL (ref 8.6–10.5)
CHLORIDE SERPL-SCNC: 106 MMOL/L (ref 98–107)
CO2 SERPL-SCNC: 29.4 MMOL/L (ref 22–29)
CREAT SERPL-MCNC: 1 MG/DL (ref 0.57–1)
EGFRCR SERPLBLD CKD-EPI 2021: 62.3 ML/MIN/1.73
ERYTHROCYTE [DISTWIDTH] IN BLOOD BY AUTOMATED COUNT: 11.8 % (ref 12.3–15.4)
GLOBULIN SER CALC-MCNC: 2.2 GM/DL
GLUCOSE SERPL-MCNC: 108 MG/DL (ref 65–99)
HCT VFR BLD AUTO: 42.7 % (ref 34–46.6)
HGB BLD-MCNC: 14.2 G/DL (ref 12–15.9)
MCH RBC QN AUTO: 29.9 PG (ref 26.6–33)
MCHC RBC AUTO-ENTMCNC: 33.3 G/DL (ref 31.5–35.7)
MCV RBC AUTO: 89.9 FL (ref 79–97)
PLATELET # BLD AUTO: 188 10*3/MM3 (ref 140–450)
POTASSIUM SERPL-SCNC: 5.4 MMOL/L (ref 3.5–5.2)
PROT SERPL-MCNC: 7 G/DL (ref 6–8.5)
RBC # BLD AUTO: 4.75 10*6/MM3 (ref 3.77–5.28)
SODIUM SERPL-SCNC: 144 MMOL/L (ref 136–145)
WBC # BLD AUTO: 4.81 10*3/MM3 (ref 3.4–10.8)

## 2024-04-23 PROCEDURE — 1159F MED LIST DOCD IN RCRD: CPT | Performed by: NURSE PRACTITIONER

## 2024-04-23 PROCEDURE — 99214 OFFICE O/P EST MOD 30 MIN: CPT | Performed by: NURSE PRACTITIONER

## 2024-04-23 PROCEDURE — 1160F RVW MEDS BY RX/DR IN RCRD: CPT | Performed by: NURSE PRACTITIONER

## 2024-04-23 RX ORDER — THIAMINE HCL 100 MG
TABLET ORAL DAILY
COMMUNITY

## 2024-04-23 RX ORDER — BIOTIN 1 MG
TABLET ORAL
COMMUNITY

## 2024-04-23 RX ORDER — DIPHENHYDRAMINE HCL 25 MG
1 CAPSULE ORAL
COMMUNITY

## 2024-04-23 NOTE — PROGRESS NOTES
Chief Complaint  Depression     Subjective:      History of Present Illness {CC  Problem List  Visit  Diagnosis   Encounters  Notes  Medications  Labs  Result Review Imaging  Media :23}     Carol Johnson presents to [facility] for:      She has not been seen since 10/2021    She had abnormal mammogram: cyst in left breast and will have repeat over summer.     Some depression at time over daughter's passing - but states in general, doing well.  Does not want medication.   Will have afternoon with other daughter today.       Patient Care Team:  Jessica Davis III, NP-C as PCP - General (Family Medicine)  Josr, Cory Ballesteros MD as Consulting Physician (Obstetrics and Gynecology)     Exercises every day - states will use weights.     Weight trend is     Health and Weight:   Weight trend is   Wt Readings from Last 4 Encounters:   04/23/24 62.8 kg (138 lb 6.4 oz)   10/21/21 62.9 kg (138 lb 9.6 oz)   10/19/20 62.6 kg (138 lb)   01/05/18 62 kg (136 lb 9.6 oz)       BMI is within normal parameters. No other follow-up for BMI required.      Health Maintenance Female:    GYN: Was seeing Dr Ovalles who has now retired.   Last gynecology appointment:   Patient's last mammogram was 2/8/24: complicated cyst in left breast: advised to repeat US in 6 months: 8/2024 ([facility])   Advised routine self-breast exams monthly.  Sexually active:   Last PAP 8/3/22  Estradiol, testosterone gets at [facility] - states before taking she was mad at the world. GYN advised life long.    States she has not had dexa for years.     Colon cancer screen:   Patient's last colonoscopy was 6/2009: Dr Wynn.   Negative and declines further.    Vaccines: declines prev 20      Last eye exam: due this year     Advised regular sunscreen.        Her cardiovascular risks are:     [] No Known risk factors    [] Hypertension   [] Hyperlipidemia  [] Diabetes    [] Obesity  [] Family  "history   [] Current or hx tobacco use  [] Sedentary lifestyle   [] Post-menopausal      I have reviewed patient's medical history, any new submitted information provided by patient or medical assistant and updated medical record.      Objective:      Physical Exam  Vitals reviewed.   Constitutional:       Appearance: Normal appearance. She is well-developed.   Neck:      Thyroid: No thyromegaly.   Cardiovascular:      Rate and Rhythm: Normal rate and regular rhythm.      Pulses: Normal pulses.      Heart sounds: Normal heart sounds.   Pulmonary:      Effort: Pulmonary effort is normal.      Breath sounds: Normal breath sounds.      Comments: E/U   Abdominal:      General: Bowel sounds are normal.   Musculoskeletal:         General: Normal range of motion.      Cervical back: Normal range of motion and neck supple.      Right lower leg: No edema.      Left lower leg: No edema.   Lymphadenopathy:      Cervical: No cervical adenopathy.   Skin:     General: Skin is warm and dry.      Capillary Refill: Capillary refill takes 2 to 3 seconds.   Neurological:      Mental Status: She is alert and oriented to person, place, and time.   Psychiatric:         Mood and Affect: Mood normal.         Behavior: Behavior normal. Behavior is cooperative.         Thought Content: Thought content normal.         Judgment: Judgment normal.        Result Review  Data Reviewed:{ Labs  Result Review  Imaging  Med Tab  Media :23}                Vital Signs:   /70 (BP Location: Left arm)   Pulse 75   Resp 20   Ht 165.1 cm (65\")   Wt 62.8 kg (138 lb 6.4 oz)   SpO2 99%   BMI 23.03 kg/m²         Requested Prescriptions      No prescriptions requested or ordered in this encounter       Routine medications provided by this office will also be refilled via pharmacy request.       Current Outpatient Medications:     Biotin 1000 MCG tablet, biotin, Disp: , Rfl:     Calcium Citrate-Vitamin D3 (CITRACAL) 315-6.25 MG-MCG tablet tablet, " Take  by mouth Daily., Disp: , Rfl:     Dextran 70-Hypromellose (Artificial Tears) 0.1-0.3 % solution, 1 drop., Disp: , Rfl:     MINIVELLE 0.1 MG/24HR patch, , Disp: , Rfl:     Testosterone Propionate (FIRST-TESTOSTERONE) 2 % ointment, Place  on the skin., Disp: , Rfl:      Assessment and Plan:      Assessment and Plan {CC Problem List  Visit Diagnosis  ROS  Review (Popup)  Health Maintenance  Quality  BestPractice  Medications  SmartSets  SnapShot Encounters  Media :23}     Problem List Items Addressed This Visit          Endocrine and Metabolic    Hormone replacement therapy - Primary (Chronic)    Overview     Prescribed by GYN         Relevant Orders    Comprehensive Metabolic Panel    CBC (No Diff)    Vitamin D deficiency (Chronic)    Relevant Orders    Vitamin D 25 hydroxy       Genitourinary and Reproductive     Breast cyst, left    Current Assessment & Plan     Will have follow up mammogram in August.   No pain.             Musculoskeletal and Injuries    Arthralgia of hands, bilateral       Follow Up {Instructions Charge Capture  Follow-up Communications :23}     Return in about 1 year (around 4/23/2025) for Medicare Wellness.      Patient was given instructions and counseling regarding her condition or for health maintenance advice. Please see specific information pulled into the AVS if appropriate.    Dragon disclaimer:   Much of this encounter note is an electronic transcription/translation of spoken language to printed text. The electronic translation of spoken language may permit erroneous, or at times, nonsensical words or phrases to be inadvertently transcribed; Although I have reviewed the note for such errors, some may still exist.     Additional Patient Counseling:       Patient Instructions   Diet:    Eat vegetables, fruits, whole grain, low-fat dairy, poultry, fish, beans, nontropical vegetable oils, and nuts, but avoid red meat (i.e., Mediterranean-style diet, DASH [Dietary  Approaches to Stop Hypertension] diet).  Limit sugary drinks and sweets.  Limit saturated and trans fat to 5% to 6% of calories.  Limit sodium intake to 2,400 mg daily (about one teaspoon table salt [kosher/sea salt have less sodium per teaspoon]).    Weight loss / Calorie Counting Apps:    Lose It!   BioCatch Pal     Exercise:   Engage in moderate-to-vigorous aerobic activity for at least 40 minutes (on average) three to four times each week.    Wearables:   Activity tracker   Step tracker     Skin Care:   Use sun screen SPF >30 daily  Dermatologist for skin check regularly    Bone Health:   [email or web address]/patients/treatment/[email or web address]    Mental Health:       CDC recommends Flu vaccines for everyone 6 months and older EVERY season with rare exceptions.

## 2024-04-23 NOTE — PATIENT INSTRUCTIONS
Diet:    Eat vegetables, fruits, whole grain, low-fat dairy, poultry, fish, beans, nontropical vegetable oils, and nuts, but avoid red meat (i.e., Mediterranean-style diet, DASH [Dietary Approaches to Stop Hypertension] diet).  Limit sugary drinks and sweets.  Limit saturated and trans fat to 5% to 6% of calories.  Limit sodium intake to 2,400 mg daily (about one teaspoon table salt [kosher/sea salt have less sodium per teaspoon]).    Weight loss / Calorie Counting Apps:    Lose It!   MyReflex Pal     Exercise:   Engage in moderate-to-vigorous aerobic activity for at least 40 minutes (on average) three to four times each week.    Wearables:   Activity tracker   Step tracker     Skin Care:   Use sun screen SPF >30 daily  Dermatologist for skin check regularly    Bone Health:   [email or web address]/patients/treatment/[email or web address]    Mental Health:       CDC recommends Flu vaccines for everyone 6 months and older EVERY season with rare exceptions.

## 2024-05-09 ENCOUNTER — TELEPHONE (OUTPATIENT)
Dept: INTERNAL MEDICINE | Facility: CLINIC | Age: 69
End: 2024-05-09
Payer: MEDICARE

## 2024-05-09 NOTE — TELEPHONE ENCOUNTER
Caller: Carol Johnson    Relationship: Self    Best call back number: [phone]     What medication are you requesting: Testosterone Propionate (FIRST-TESTOSTERONE) 2 % ointment     Have you had these symptoms before:    [x] Yes  [] No    Have you been treated for these symptoms before:   [x] Yes  [] No    If a prescription is needed, what is your preferred pharmacy and phone number[facility] [address] [facility] [phone] [facility] [phone] FX     Additional notes: PATIENT STATES THAT HER OB/GYN HAS SEMI RETIRED, AND SHE WOULD LIKE PCP TO TAKE OVER PRESCRIPTION IF ABLE. CALL IF ADDITIONAL FOLLOW UP NEEDED

## 2024-05-09 NOTE — TELEPHONE ENCOUNTER
Patient current obgyn is retired and doesn't write her testosterone ointment and was wondering if she can get a new referral to an obgyn?     Advised you were out of office until Monday.     Please advise

## 2024-05-14 DIAGNOSIS — Z79.890 HORMONE REPLACEMENT THERAPY: Primary | ICD-10-CM

## 2024-05-14 DIAGNOSIS — Z78.0 POSTMENOPAUSAL: ICD-10-CM

## 2024-08-09 ENCOUNTER — OFFICE VISIT (OUTPATIENT)
Dept: OBSTETRICS AND GYNECOLOGY | Age: 69
End: 2024-08-09
Payer: MEDICARE

## 2024-08-09 ENCOUNTER — PATIENT ROUNDING (BHMG ONLY) (OUTPATIENT)
Dept: OBSTETRICS AND GYNECOLOGY | Age: 69
End: 2024-08-09
Payer: MEDICARE

## 2024-08-09 ENCOUNTER — APPOINTMENT (OUTPATIENT)
Dept: WOMENS IMAGING | Facility: HOSPITAL | Age: 69
End: 2024-08-09
Payer: MEDICARE

## 2024-08-09 VITALS
HEIGHT: 65 IN | WEIGHT: 135 LBS | BODY MASS INDEX: 22.49 KG/M2 | SYSTOLIC BLOOD PRESSURE: 120 MMHG | DIASTOLIC BLOOD PRESSURE: 84 MMHG

## 2024-08-09 DIAGNOSIS — Z79.890 HORMONE REPLACEMENT THERAPY: Primary | Chronic | ICD-10-CM

## 2024-08-09 PROCEDURE — 77066 DX MAMMO INCL CAD BI: CPT | Performed by: RADIOLOGY

## 2024-08-09 PROCEDURE — G0279 TOMOSYNTHESIS, MAMMO: HCPCS | Performed by: RADIOLOGY

## 2024-08-09 PROCEDURE — 76642 ULTRASOUND BREAST LIMITED: CPT | Performed by: RADIOLOGY

## 2024-08-09 RX ORDER — ESTRADIOL 0.1 MG/D
1 FILM, EXTENDED RELEASE TRANSDERMAL 2 TIMES WEEKLY
Qty: 24 EACH | Refills: 3 | Status: SHIPPED | OUTPATIENT
Start: 2024-08-12

## 2024-08-09 NOTE — PROGRESS NOTES
[facility]   Obstetrics and Gynecology     2024    Patient: Carol Johnson          MR#:[id number]    History of Present Illness    Chief Complaint   Patient presents with    Gynecologic Exam     Cc: new gyn, last pap 8/3/22 neg, pt wanting hormone therapy        68 y.o. female  who presents for annual exam.    Relevant data reviewed:    No LMP recorded. Patient has had a hysterectomy.  Obstetric History:  OB History          3    Para   3    Term   3            AB        Living   2         SAB        IAB        Ectopic        Molar        Multiple        Live Births   2               Menstrual History:     No LMP recorded. Patient has had a hysterectomy.       Social History     Substance and Sexual Activity   Sexual Activity Yes    Partners: Male    Birth control/protection: Post-menopausal     ______________________________________  Patient Active Problem List   Diagnosis    Benign paroxysmal positional vertigo of left ear    Arthralgia of hands, bilateral    Bilateral carpal tunnel syndrome    Hormone replacement therapy    Vitamin D deficiency    Breast cyst, left     Past Medical History:   Diagnosis Date    Allergic     latex    Cancer     Melanoma: x2 or R arm     H/O allergic rhinitis     H/O mumps     H/O: whooping cough     Hearing loss     History of anemia     History of bleeding disorder     History of bronchitis     History of chickenpox     History of depression     History of hemorrhoids     History of joint pain     History of miscarriage     History of skin cancer     melanoma    Wears glasses      Past Surgical History:   Procedure Laterality Date    COLONOSCOPY      FETAL BLOOD TRANSFUSION      H/O    HYSTERECTOMY      SUBTOTAL HYSTERECTOMY  2000    uterus     Social History     Tobacco Use   Smoking Status Former    Current packs/day: 0.00    Average packs/day: 1.5 packs/day for 22.0 years (33.0 ttl pk-yrs)    Types: Cigarettes    Start date:  "1978    Quit date: 2000    Years since quittin.5   Smokeless Tobacco Never     Family History   Problem Relation Age of Onset    Heart failure Mother     Heart failure Father     No Known Problems Brother     No Known Problems Brother     Kidney disease Daughter         transplant     Prior to Admission medications    Medication Sig Start Date End Date Taking? Authorizing Provider   Biotin 1000 MCG tablet biotin   Yes Carlotta Palmer MD   Calcium Citrate-Vitamin D3 (CITRACAL) 315-6.25 MG-MCG tablet tablet Take  by mouth Daily.   Yes Carlotta Palmer MD   Dextran 70-Hypromellose (Artificial Tears) 0.1-0.3 % solution 1 drop.   Yes Carlotta Palmer MD   MINIVELLE 0.1 MG/24HR patch  16  Yes Carlotta Palmer MD   Testosterone Propionate (FIRST-TESTOSTERONE) 2 % ointment Place  on the skin.   Yes Carlotta Palmer MD     _______________________________________    Current contraception: {contraceptive method:5051}  History of abnormal Pap smear: {yes***/no:49830}  Family history of uterine or ovarian cancer: {yes***/no:32082}  Family History of colon cancer/colon polyps: {yes**/no:62755}  History of abnormal mammogram: {yes***/no:94347}  History of abnormal lipids: {yes***/no:68906}    {Common ambulatory SmartLinks:13723}        {ROS - COMPLETE + GYN:4235450381}       Objective   Physical Exam    /84   Ht 165 cm (64.96\")   Wt 61.2 kg (135 lb)   BMI 22.49 kg/m²    BP Readings from Last 3 Encounters:   24 120/84   24 130/70   10/21/21 120/78      Wt Readings from Last 3 Encounters:   24 61.2 kg (135 lb)   24 62.8 kg (138 lb 6.4 oz)   10/21/21 62.9 kg (138 lb 9.6 oz)        BMI: Estimated body mass index is 22.49 kg/m² as calculated from the following:    Height as of this encounter: 165 cm (64.96\").    Weight as of this encounter: 61.2 kg (135 lb).    As part of wellness and prevention, the following topics were discussed with the patient:  {HEB " annual gen counseling :95159}      Reminder:   Complete UI metric     Problem List   Meds  History  Prep for Surg   Imagin}    Assessment:  68 y.o. female  who presents for annual exam.  There are no diagnoses linked to this encounter.  Plan:  No follow-ups on file.    Soraya Carcamo MD  2024 13:06 EDT

## 2024-08-09 NOTE — PROGRESS NOTES
[facility]   Obstetrics and Gynecology   New Gynecology Visit    2024    Patient: Carol Johnson          MR#:[id number]    History of Present Illness    Chief Complaint   Patient presents with    Gynecologic Exam     Cc: new gyn, last pap 8/3/22 neg, pt wanting hormone therapy        68 y.o. female  who presents for discussion about hormone therapy. She was previously seeing Dr. Ovalles and getting transdermal estrogen patches and transdermal testosterone. She has no other concerns or complaints today and got her mammogram this AM.         Obstetric History:  OB History          3    Para   3    Term   3            AB        Living   2         SAB        IAB        Ectopic        Molar        Multiple        Live Births   2               Menstrual History:     No LMP recorded. Patient has had a hysterectomy.                ________________________________________  Patient Active Problem List   Diagnosis    Benign paroxysmal positional vertigo of left ear    Arthralgia of hands, bilateral    Bilateral carpal tunnel syndrome    Hormone replacement therapy    Vitamin D deficiency    Breast cyst, left     Past Medical History:   Diagnosis Date    Allergic     latex    Cancer     Melanoma: x2 or R arm     H/O allergic rhinitis     H/O mumps     H/O: whooping cough     Hearing loss     History of anemia     History of bleeding disorder     History of bronchitis     History of chickenpox     History of depression     History of hemorrhoids     History of joint pain     History of miscarriage     History of skin cancer     melanoma    Wears glasses      Past Surgical History:   Procedure Laterality Date    COLONOSCOPY      FETAL BLOOD TRANSFUSION      H/O    HYSTERECTOMY      SUBTOTAL HYSTERECTOMY  2000    uterus     Social History     Tobacco Use   Smoking Status Former    Current packs/day: 0.00    Average packs/day: 1.5 packs/day for 22.0 years (33.0 ttl pk-yrs)     "Types: Cigarettes    Start date: 1978    Quit date: 2000    Years since quittin.5   Smokeless Tobacco Never     Family History   Problem Relation Age of Onset    Heart failure Mother     Heart failure Father     No Known Problems Brother     No Known Problems Brother     Kidney disease Daughter         transplant     Prior to Admission medications    Medication Sig Start Date End Date Taking? Authorizing Provider   Biotin 1000 MCG tablet biotin   Yes Carlotta Palmer MD   Calcium Citrate-Vitamin D3 (CITRACAL) 315-6.25 MG-MCG tablet tablet Take  by mouth Daily.   Yes Carlotta Palmer MD   Dextran 70-Hypromellose (Artificial Tears) 0.1-0.3 % solution 1 drop.   Yes Carlotta Palmer MD   Minivelle 0.1 MG/24HR patch Place 1 patch on the skin as directed by provider 2 (Two) Times a Week. 24  Yes Soraya Carcamo MD   Testosterone Propionate (FIRST-TESTOSTERONE) 2 % ointment Place  on the skin.   Yes Carlotta Palmer MD   MINIVELLE 0.1 MG/24HR patch  16 Yes Carlotta Palmer MD     ________________________________________    The following portions of the patient's history were reviewed and updated as appropriate: allergies, current medications, past family history, past medical history, past social history, past surgical history, and problem list.           Objective     /84   Ht 165 cm (64.96\")   Wt 61.2 kg (135 lb)   BMI 22.49 kg/m²    BP Readings from Last 3 Encounters:   24 120/84   24 130/70   10/21/21 120/78      Wt Readings from Last 3 Encounters:   24 61.2 kg (135 lb)   24 62.8 kg (138 lb 6.4 oz)   10/21/21 62.9 kg (138 lb 9.6 oz)        BMI: Estimated body mass index is 22.49 kg/m² as calculated from the following:    Height as of this encounter: 165 cm (64.96\").    Weight as of this encounter: 61.2 kg (135 lb).    Physical Exam  Vitals and nursing note reviewed.   Constitutional:       Appearance: Normal appearance. "   HENT:      Head: Normocephalic and atraumatic.   Pulmonary:      Effort: Pulmonary effort is normal.   Neurological:      Mental Status: She is alert and oriented to person, place, and time.   Psychiatric:         Mood and Affect: Mood normal.                 Assessment:  Diagnoses and all orders for this visit:    1. Hormone replacement therapy (Primary)  Comments:  Discussed that testosterone therapy is not recommended, but continuing estrogen therapy is reasonable in light of hotflashes.   Minivelle dot sent to pharmacy.  Overview:  Prescribed by GYN    Orders:  -     Minivelle 0.1 MG/24HR patch; Place 1 patch on the skin as directed by provider 2 (Two) Times a Week.  Dispense: 24 each; Refill: 3          Plan:  No follow-ups on file.      Soraya Carcamo MD  8/9/2024 13:26 EDT

## 2024-08-12 ENCOUNTER — TELEPHONE (OUTPATIENT)
Dept: INTERNAL MEDICINE | Facility: CLINIC | Age: 69
End: 2024-08-12
Payer: MEDICARE

## 2024-08-12 NOTE — TELEPHONE ENCOUNTER
I had [facility] calling regarding patient Carol Johnson they are needing a new referral order placed for a Right Breast Stereotactic Biopsy due to abnormal mammogram will be sending the report

## 2024-08-13 DIAGNOSIS — N64.89 BREAST ASYMMETRY IN FEMALE: ICD-10-CM

## 2024-08-13 DIAGNOSIS — R92.8 ABNORMAL MAMMOGRAM OF RIGHT BREAST: Primary | ICD-10-CM

## 2024-08-13 DIAGNOSIS — Z12.31 ENCOUNTER FOR SCREENING MAMMOGRAM FOR MALIGNANT NEOPLASM OF BREAST: Primary | ICD-10-CM

## 2024-08-13 DIAGNOSIS — R92.333 HETEROGENEOUSLY DENSE TISSUE OF BOTH BREASTS ON MAMMOGRAPHY: ICD-10-CM

## 2024-08-13 DIAGNOSIS — R92.8 OTHER ABNORMAL AND INCONCLUSIVE FINDINGS ON DIAGNOSTIC IMAGING OF BREAST: ICD-10-CM

## 2024-08-13 NOTE — TELEPHONE ENCOUNTER
Please call her:     Left breast: they feel the two areas are cysts.   Right breast: there is asymmetry and they would like further evaluation with a bx.   I have placed the order and they should be contacting her for an appointment.     Please let [facility] know that order has been placed also.     RUDDY

## 2024-09-10 ENCOUNTER — LAB REQUISITION (OUTPATIENT)
Dept: LAB | Facility: HOSPITAL | Age: 69
End: 2024-09-10
Payer: MEDICARE

## 2024-09-10 ENCOUNTER — HOSPITAL ENCOUNTER (OUTPATIENT)
Dept: MAMMOGRAPHY | Facility: HOSPITAL | Age: 69
Discharge: HOME | End: 2024-09-10
Payer: MEDICARE

## 2024-09-10 ENCOUNTER — APPOINTMENT (OUTPATIENT)
Dept: WOMENS IMAGING | Facility: HOSPITAL | Age: 69
End: 2024-09-10
Payer: MEDICARE

## 2024-09-10 DIAGNOSIS — N64.89 BREAST ASYMMETRY: ICD-10-CM

## 2024-09-10 DIAGNOSIS — N64.89 OTHER SPECIFIED DISORDERS OF BREAST: ICD-10-CM

## 2024-09-10 PROCEDURE — A4648 IMPLANTABLE TISSUE MARKER: HCPCS | Performed by: RADIOLOGY

## 2024-09-10 PROCEDURE — 19081 BX BREAST 1ST LESION STRTCTC: CPT | Performed by: RADIOLOGY

## 2024-09-10 PROCEDURE — 88305 TISSUE EXAM BY PATHOLOGIST: CPT | Performed by: NURSE PRACTITIONER

## 2024-09-10 PROCEDURE — 76098 X-RAY EXAM SURGICAL SPECIMEN: CPT

## 2024-09-10 PROCEDURE — C1819 TISSUE LOCALIZATION-EXCISION: HCPCS | Performed by: RADIOLOGY

## 2024-09-12 LAB
CYTO UR: NORMAL
DX PRELIMINARY: NORMAL
LAB AP CASE REPORT: NORMAL
LAB AP CLINICAL INFORMATION: NORMAL
PATH REPORT.FINAL DX SPEC: NORMAL
PATH REPORT.GROSS SPEC: NORMAL

## 2024-09-16 DIAGNOSIS — R92.333 HETEROGENEOUSLY DENSE TISSUE OF BOTH BREASTS ON MAMMOGRAPHY: Primary | ICD-10-CM

## 2024-09-16 DIAGNOSIS — R92.8 ABNORMAL MAMMOGRAM OF RIGHT BREAST: ICD-10-CM

## 2025-03-10 ENCOUNTER — APPOINTMENT (OUTPATIENT)
Dept: WOMENS IMAGING | Facility: HOSPITAL | Age: 70
End: 2025-03-10
Payer: MEDICARE

## 2025-03-10 PROCEDURE — G0279 TOMOSYNTHESIS, MAMMO: HCPCS | Performed by: RADIOLOGY

## 2025-03-10 PROCEDURE — 77065 DX MAMMO INCL CAD UNI: CPT | Performed by: RADIOLOGY

## 2025-07-27 DIAGNOSIS — Z79.890 HORMONE REPLACEMENT THERAPY: Chronic | ICD-10-CM

## 2025-07-28 RX ORDER — ESTRADIOL 0.1 MG/D
1 FILM, EXTENDED RELEASE TRANSDERMAL 2 TIMES WEEKLY
Qty: 24 PATCH | Refills: 3 | OUTPATIENT
Start: 2025-07-28

## 2025-08-11 ENCOUNTER — APPOINTMENT (OUTPATIENT)
Dept: WOMENS IMAGING | Facility: HOSPITAL | Age: 70
End: 2025-08-11
Payer: MEDICARE

## 2025-08-11 PROCEDURE — 77063 BREAST TOMOSYNTHESIS BI: CPT | Performed by: RADIOLOGY

## 2025-08-11 PROCEDURE — 77067 SCR MAMMO BI INCL CAD: CPT | Performed by: RADIOLOGY

## 2025-08-21 ENCOUNTER — OFFICE VISIT (OUTPATIENT)
Dept: OBSTETRICS AND GYNECOLOGY | Age: 70
End: 2025-08-21
Payer: MEDICARE

## 2025-08-21 VITALS
HEIGHT: 65 IN | SYSTOLIC BLOOD PRESSURE: 132 MMHG | BODY MASS INDEX: 21.63 KG/M2 | WEIGHT: 129.8 LBS | DIASTOLIC BLOOD PRESSURE: 90 MMHG

## 2025-08-21 DIAGNOSIS — Z79.890 HORMONE REPLACEMENT THERAPY: Chronic | ICD-10-CM

## 2025-08-21 RX ORDER — ESTRADIOL 0.1 MG/D
1 FILM, EXTENDED RELEASE TRANSDERMAL 2 TIMES WEEKLY
Qty: 24 EACH | Refills: 3 | Status: SHIPPED | OUTPATIENT
Start: 2025-08-21